# Patient Record
Sex: MALE | Race: OTHER
[De-identification: names, ages, dates, MRNs, and addresses within clinical notes are randomized per-mention and may not be internally consistent; named-entity substitution may affect disease eponyms.]

---

## 2018-06-26 ENCOUNTER — HOSPITAL ENCOUNTER (EMERGENCY)
Dept: HOSPITAL 54 - ER | Age: 58
Discharge: HOME | End: 2018-06-26
Payer: MEDICAID

## 2018-06-26 VITALS — WEIGHT: 146 LBS | BODY MASS INDEX: 24.32 KG/M2 | HEIGHT: 65 IN

## 2018-06-26 VITALS — DIASTOLIC BLOOD PRESSURE: 86 MMHG | SYSTOLIC BLOOD PRESSURE: 149 MMHG

## 2018-06-26 DIAGNOSIS — F17.210: ICD-10-CM

## 2018-06-26 DIAGNOSIS — F10.10: ICD-10-CM

## 2018-06-26 DIAGNOSIS — G89.29: ICD-10-CM

## 2018-06-26 DIAGNOSIS — Z90.49: ICD-10-CM

## 2018-06-26 DIAGNOSIS — R10.13: Primary | ICD-10-CM

## 2018-06-26 DIAGNOSIS — Z88.6: ICD-10-CM
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
## 2018-06-26 LAB
ALBUMIN SERPL BCP-MCNC: 4.1 G/DL (ref 3.4–5)
ALP SERPL-CCNC: 89 U/L (ref 46–116)
ALT SERPL W P-5'-P-CCNC: 19 U/L (ref 12–78)
AST SERPL W P-5'-P-CCNC: 15 U/L (ref 15–37)
BASOPHILS # BLD AUTO: 0.3 /CMM (ref 0–0.2)
BASOPHILS NFR BLD AUTO: 4.4 % (ref 0–2)
BILIRUB DIRECT SERPL-MCNC: 0.1 MG/DL (ref 0–0.2)
BILIRUB SERPL-MCNC: 0.5 MG/DL (ref 0.2–1)
BUN SERPL-MCNC: 10 MG/DL (ref 7–18)
CALCIUM SERPL-MCNC: 9.5 MG/DL (ref 8.5–10.1)
CHLORIDE SERPL-SCNC: 103 MMOL/L (ref 98–107)
CO2 SERPL-SCNC: 28 MMOL/L (ref 21–32)
CREAT SERPL-MCNC: 1 MG/DL (ref 0.6–1.3)
EOSINOPHIL NFR BLD AUTO: 7.5 % (ref 0–6)
GLUCOSE SERPL-MCNC: 74 MG/DL (ref 74–106)
HCT VFR BLD AUTO: 42 % (ref 39–51)
HGB BLD-MCNC: 14.7 G/DL (ref 13.5–17.5)
LIPASE SERPL-CCNC: 167 U/L (ref 73–393)
LYMPHOCYTES NFR BLD AUTO: 1.6 /CMM (ref 0.8–4.8)
LYMPHOCYTES NFR BLD AUTO: 20.7 % (ref 20–44)
MCHC RBC AUTO-ENTMCNC: 35 G/DL (ref 31–36)
MCV RBC AUTO: 89 FL (ref 80–96)
MONOCYTES NFR BLD AUTO: 0.5 /CMM (ref 0.1–1.3)
MONOCYTES NFR BLD AUTO: 6.5 % (ref 2–12)
NEUTROPHILS # BLD AUTO: 4.6 /CMM (ref 1.8–8.9)
NEUTROPHILS NFR BLD AUTO: 60.9 % (ref 43–81)
PLATELET # BLD AUTO: 318 /CMM (ref 150–450)
POTASSIUM SERPL-SCNC: 3.6 MMOL/L (ref 3.5–5.1)
PROT SERPL-MCNC: 7.4 G/DL (ref 6.4–8.2)
RBC # BLD AUTO: 4.68 MIL/UL (ref 4.5–6)
RDW COEFFICIENT OF VARIATION: 12.1 (ref 11.5–15)
SODIUM SERPL-SCNC: 139 MMOL/L (ref 136–145)
WBC NRBC COR # BLD AUTO: 7.6 K/UL (ref 4.3–11)

## 2018-06-26 PROCEDURE — 80076 HEPATIC FUNCTION PANEL: CPT

## 2018-06-26 PROCEDURE — 80048 BASIC METABOLIC PNL TOTAL CA: CPT

## 2018-06-26 PROCEDURE — G0480 DRUG TEST DEF 1-7 CLASSES: HCPCS

## 2018-06-26 PROCEDURE — Z7610: HCPCS

## 2018-06-26 PROCEDURE — 36415 COLL VENOUS BLD VENIPUNCTURE: CPT

## 2018-06-26 PROCEDURE — 85025 COMPLETE CBC W/AUTO DIFF WBC: CPT

## 2018-06-26 PROCEDURE — 99284 EMERGENCY DEPT VISIT MOD MDM: CPT

## 2018-06-26 PROCEDURE — 83690 ASSAY OF LIPASE: CPT

## 2018-06-26 PROCEDURE — A4606 OXYGEN PROBE USED W OXIMETER: HCPCS

## 2018-06-26 NOTE — NUR
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 PT 
SAID HE WAS SEEN BY HIS PMD FOR THIS PROB ON 6/21 AND HE WAS REFERRED TO A 
SPECIALIST, BUT HE'S STILL WAITING FOR THE REFFERAL FROM HIS PMD. PAIN IS 
GETTING WORSE. PT DENIES N/V/D. NAD VSS RR EVEN AND UNLABORED. SKIN IS WARM AND 
NON DIAPHORETIC. PENDING ER MD SANDERS

## 2018-06-30 ENCOUNTER — HOSPITAL ENCOUNTER (EMERGENCY)
Dept: HOSPITAL 54 - ER | Age: 58
Discharge: HOME | End: 2018-06-30
Payer: MEDICAID

## 2018-06-30 VITALS — BODY MASS INDEX: 23.3 KG/M2 | WEIGHT: 145 LBS | HEIGHT: 66 IN

## 2018-06-30 VITALS — DIASTOLIC BLOOD PRESSURE: 78 MMHG | SYSTOLIC BLOOD PRESSURE: 156 MMHG

## 2018-06-30 DIAGNOSIS — Z88.8: ICD-10-CM

## 2018-06-30 DIAGNOSIS — F10.10: ICD-10-CM

## 2018-06-30 DIAGNOSIS — F17.210: ICD-10-CM

## 2018-06-30 DIAGNOSIS — R10.13: Primary | ICD-10-CM
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
## 2018-06-30 DIAGNOSIS — K21.9: ICD-10-CM

## 2018-06-30 DIAGNOSIS — Z88.6: ICD-10-CM

## 2018-06-30 PROCEDURE — Z7610: HCPCS

## 2018-06-30 PROCEDURE — A4606 OXYGEN PROBE USED W OXIMETER: HCPCS

## 2018-06-30 PROCEDURE — 99283 EMERGENCY DEPT VISIT LOW MDM: CPT

## 2020-01-24 ENCOUNTER — HOSPITAL ENCOUNTER (EMERGENCY)
Dept: HOSPITAL 54 - ER | Age: 60
Discharge: HOME | End: 2020-01-24
Payer: MEDICAID

## 2020-01-24 VITALS — BODY MASS INDEX: 25.55 KG/M2 | WEIGHT: 159 LBS | HEIGHT: 66 IN

## 2020-01-24 VITALS — SYSTOLIC BLOOD PRESSURE: 125 MMHG | DIASTOLIC BLOOD PRESSURE: 75 MMHG

## 2020-01-24 DIAGNOSIS — Z90.49: ICD-10-CM

## 2020-01-24 DIAGNOSIS — J45.901: Primary | ICD-10-CM

## 2020-01-24 DIAGNOSIS — F10.10: ICD-10-CM
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
## 2020-01-24 DIAGNOSIS — Z88.6: ICD-10-CM

## 2020-01-24 DIAGNOSIS — Z88.8: ICD-10-CM

## 2020-01-24 DIAGNOSIS — K21.9: ICD-10-CM

## 2020-01-24 DIAGNOSIS — R00.0: ICD-10-CM

## 2020-01-24 DIAGNOSIS — Z79.899: ICD-10-CM

## 2020-01-24 DIAGNOSIS — F17.210: ICD-10-CM

## 2020-01-24 LAB
ALBUMIN SERPL BCP-MCNC: 4.3 G/DL (ref 3.4–5)
ALP SERPL-CCNC: 93 U/L (ref 46–116)
ALT SERPL W P-5'-P-CCNC: 27 U/L (ref 12–78)
AST SERPL W P-5'-P-CCNC: 21 U/L (ref 15–37)
BASE EXCESS BLDA CALC-SCNC: -2.3 MMOL/L
BASOPHILS # BLD AUTO: 0.1 /CMM (ref 0–0.2)
BASOPHILS NFR BLD AUTO: 1.3 % (ref 0–2)
BILIRUB DIRECT SERPL-MCNC: 0.1 MG/DL (ref 0–0.2)
BILIRUB SERPL-MCNC: 0.3 MG/DL (ref 0.2–1)
BUN SERPL-MCNC: 21 MG/DL (ref 7–18)
CALCIUM SERPL-MCNC: 8.9 MG/DL (ref 8.5–10.1)
CHLORIDE SERPL-SCNC: 106 MMOL/L (ref 98–107)
CO2 SERPL-SCNC: 30 MMOL/L (ref 21–32)
CREAT SERPL-MCNC: 1.4 MG/DL (ref 0.6–1.3)
DO-HGB MFR BLDA: 16.6 MMHG
EOSINOPHIL NFR BLD AUTO: 14.7 % (ref 0–6)
GLUCOSE SERPL-MCNC: 168 MG/DL (ref 74–106)
HCT VFR BLD AUTO: 49 % (ref 39–51)
HGB BLD-MCNC: 16.1 G/DL (ref 13.5–17.5)
INHALED O2 CONCENTRATION: 40 %
LYMPHOCYTES NFR BLD AUTO: 4.2 /CMM (ref 0.8–4.8)
LYMPHOCYTES NFR BLD AUTO: 40.3 % (ref 20–44)
MCHC RBC AUTO-ENTMCNC: 33 G/DL (ref 31–36)
MCV RBC AUTO: 94 FL (ref 80–96)
MONOCYTES NFR BLD AUTO: 0.9 /CMM (ref 0.1–1.3)
MONOCYTES NFR BLD AUTO: 8.7 % (ref 2–12)
NEUTROPHILS # BLD AUTO: 3.6 /CMM (ref 1.8–8.9)
NEUTROPHILS NFR BLD AUTO: 35 % (ref 43–81)
NT-PROBNP SERPL-MCNC: 32 PG/ML (ref 0–125)
PCO2 TEMP ADJ BLDA: 53.3 MMHG (ref 35–45)
PH TEMP ADJ BLDA: 7.29 [PH] (ref 7.35–7.45)
PLATELET # BLD AUTO: 313 /CMM (ref 150–450)
PO2 TEMP ADJ BLDA: 207.3 MMHG (ref 75–100)
POTASSIUM SERPL-SCNC: 4.3 MMOL/L (ref 3.5–5.1)
PROT SERPL-MCNC: 8 G/DL (ref 6.4–8.2)
RBC # BLD AUTO: 5.22 MIL/UL (ref 4.5–6)
SAO2 % BLDA: 98.7 % (ref 92–98.5)
SODIUM SERPL-SCNC: 144 MMOL/L (ref 136–145)
VENTILATION MODE VENT: (no result)
WBC NRBC COR # BLD AUTO: 10.3 K/UL (ref 4.3–11)

## 2020-01-24 PROCEDURE — 99285 EMERGENCY DEPT VISIT HI MDM: CPT

## 2020-01-24 PROCEDURE — 96375 TX/PRO/DX INJ NEW DRUG ADDON: CPT

## 2020-01-24 PROCEDURE — 36415 COLL VENOUS BLD VENIPUNCTURE: CPT

## 2020-01-24 PROCEDURE — 71045 X-RAY EXAM CHEST 1 VIEW: CPT

## 2020-01-24 PROCEDURE — 80076 HEPATIC FUNCTION PANEL: CPT

## 2020-01-24 PROCEDURE — 94644 CONT INHLJ TX 1ST HOUR: CPT

## 2020-01-24 PROCEDURE — 87040 BLOOD CULTURE FOR BACTERIA: CPT

## 2020-01-24 PROCEDURE — 83605 ASSAY OF LACTIC ACID: CPT

## 2020-01-24 PROCEDURE — 87081 CULTURE SCREEN ONLY: CPT

## 2020-01-24 PROCEDURE — 84484 ASSAY OF TROPONIN QUANT: CPT

## 2020-01-24 PROCEDURE — 96365 THER/PROPH/DIAG IV INF INIT: CPT

## 2020-01-24 PROCEDURE — 87804 INFLUENZA ASSAY W/OPTIC: CPT

## 2020-01-24 PROCEDURE — 80048 BASIC METABOLIC PNL TOTAL CA: CPT

## 2020-01-24 PROCEDURE — 93005 ELECTROCARDIOGRAM TRACING: CPT

## 2020-01-24 PROCEDURE — 36600 WITHDRAWAL OF ARTERIAL BLOOD: CPT

## 2020-01-24 PROCEDURE — 83880 ASSAY OF NATRIURETIC PEPTIDE: CPT

## 2020-01-24 PROCEDURE — 85025 COMPLETE CBC W/AUTO DIFF WBC: CPT

## 2020-01-24 PROCEDURE — 82803 BLOOD GASES ANY COMBINATION: CPT

## 2020-01-24 NOTE — NUR
MD AT BEDSIDE FOR RE-EVALUATION. RT AT BEDSIDE. PATIENT PLACED ON 3L OF NASAL 
CANNULA. REMOVED FROM BIPAP

## 2020-01-24 NOTE — NUR
PATIENT PLACED ON BIPAP, SETTINGS 500 TIDAL VOLUM, 15/5 IPAP/EPAP, FIO2 AT 40%. 
PATIENT ALSO RECEIVING ALBUTEROL AND ATROVENT BREATHING TREATMENT.

## 2020-01-24 NOTE — NUR
RT NOTE



POST ABG, PER MD INFANTE, PT TAKEN OFF BIPAP PLACED ON 3L NC. MD INFANTE AND RN BEDSIDE. 
NO SOB NOTED.

-------------------------------------------------------------------------------

Addendum: 01/24/20 at 0431 by MAYRA FATIMA RT

-------------------------------------------------------------------------------

BIPAP BEDSIDE.

## 2020-01-24 NOTE — NUR
PT ARRIVED TO ER BIB RA TO BED 5 C/O SOB. PT'S FAMILY MEMBER STATES THAT HE HAS 
SOB SINCE YESTERDAY 7PM. PT ARRIVED TO ER W/ NASAL FLARING, PALE, AND IN 
RESPIRATORY DISTRESS. AAOX4. MD AT BEDSIDE, RT AT BEDSIDE, AND NURSES AT 
BEDSIDE.